# Patient Record
Sex: MALE | Race: BLACK OR AFRICAN AMERICAN | NOT HISPANIC OR LATINO | URBAN - METROPOLITAN AREA
[De-identification: names, ages, dates, MRNs, and addresses within clinical notes are randomized per-mention and may not be internally consistent; named-entity substitution may affect disease eponyms.]

---

## 2021-12-31 ENCOUNTER — LAB VISIT (OUTPATIENT)
Dept: PRIMARY CARE CLINIC | Facility: OTHER | Age: 26
End: 2021-12-31
Payer: MEDICAID

## 2021-12-31 DIAGNOSIS — R05.9 COUGH: ICD-10-CM

## 2021-12-31 PROCEDURE — U0003 INFECTIOUS AGENT DETECTION BY NUCLEIC ACID (DNA OR RNA); SEVERE ACUTE RESPIRATORY SYNDROME CORONAVIRUS 2 (SARS-COV-2) (CORONAVIRUS DISEASE [COVID-19]), AMPLIFIED PROBE TECHNIQUE, MAKING USE OF HIGH THROUGHPUT TECHNOLOGIES AS DESCRIBED BY CMS-2020-01-R: HCPCS | Mod: ST72

## 2022-01-04 LAB
SARS-COV-2 RNA RESP QL NAA+PROBE: DETECTED
SARS-COV-2- CYCLE NUMBER: 22

## 2022-11-08 PROCEDURE — 96375 TX/PRO/DX INJ NEW DRUG ADDON: CPT

## 2022-11-08 PROCEDURE — 99284 EMERGENCY DEPT VISIT MOD MDM: CPT | Mod: 25

## 2022-11-08 PROCEDURE — 96374 THER/PROPH/DIAG INJ IV PUSH: CPT

## 2022-11-09 ENCOUNTER — HOSPITAL ENCOUNTER (EMERGENCY)
Facility: HOSPITAL | Age: 27
Discharge: HOME | End: 2022-11-09
Attending: STUDENT IN AN ORGANIZED HEALTH CARE EDUCATION/TRAINING PROGRAM
Payer: MEDICAID

## 2022-11-09 VITALS
HEART RATE: 66 BPM | BODY MASS INDEX: 19.62 KG/M2 | SYSTOLIC BLOOD PRESSURE: 130 MMHG | OXYGEN SATURATION: 99 % | WEIGHT: 125 LBS | DIASTOLIC BLOOD PRESSURE: 58 MMHG | HEIGHT: 67 IN | RESPIRATION RATE: 22 BRPM | TEMPERATURE: 98 F

## 2022-11-09 DIAGNOSIS — R00.1 BRADYCARDIA: ICD-10-CM

## 2022-11-09 DIAGNOSIS — R55 SYNCOPE: ICD-10-CM

## 2022-11-09 DIAGNOSIS — F11.93 OPIATE WITHDRAWAL: Primary | ICD-10-CM

## 2022-11-09 LAB
ALBUMIN SERPL BCP-MCNC: 4.9 G/DL (ref 3.5–5.2)
ALP SERPL-CCNC: 55 U/L (ref 55–135)
ALT SERPL W/O P-5'-P-CCNC: 20 U/L (ref 10–44)
ANION GAP SERPL CALC-SCNC: 18 MMOL/L (ref 8–16)
AST SERPL-CCNC: 22 U/L (ref 10–40)
BASOPHILS # BLD AUTO: 0.02 K/UL (ref 0–0.2)
BASOPHILS NFR BLD: 0.1 % (ref 0–1.9)
BILIRUB SERPL-MCNC: 0.5 MG/DL (ref 0.1–1)
BUN SERPL-MCNC: 12 MG/DL (ref 6–20)
CALCIUM SERPL-MCNC: 10.2 MG/DL (ref 8.7–10.5)
CHLORIDE SERPL-SCNC: 102 MMOL/L (ref 95–110)
CK SERPL-CCNC: 559 U/L (ref 20–200)
CO2 SERPL-SCNC: 20 MMOL/L (ref 23–29)
CREAT SERPL-MCNC: 1.2 MG/DL (ref 0.5–1.4)
DIFFERENTIAL METHOD: ABNORMAL
EOSINOPHIL # BLD AUTO: 0 K/UL (ref 0–0.5)
EOSINOPHIL NFR BLD: 0 % (ref 0–8)
ERYTHROCYTE [DISTWIDTH] IN BLOOD BY AUTOMATED COUNT: 15 % (ref 11.5–14.5)
EST. GFR  (NO RACE VARIABLE): >60 ML/MIN/1.73 M^2
GLUCOSE SERPL-MCNC: 125 MG/DL (ref 70–110)
HCT VFR BLD AUTO: 43.6 % (ref 40–54)
HGB BLD-MCNC: 14 G/DL (ref 14–18)
IMM GRANULOCYTES # BLD AUTO: 0.05 K/UL (ref 0–0.04)
IMM GRANULOCYTES NFR BLD AUTO: 0.3 % (ref 0–0.5)
LYMPHOCYTES # BLD AUTO: 1.5 K/UL (ref 1–4.8)
LYMPHOCYTES NFR BLD: 10.4 % (ref 18–48)
MCH RBC QN AUTO: 22.8 PG (ref 27–31)
MCHC RBC AUTO-ENTMCNC: 32.1 G/DL (ref 32–36)
MCV RBC AUTO: 71 FL (ref 82–98)
MONOCYTES # BLD AUTO: 0.5 K/UL (ref 0.3–1)
MONOCYTES NFR BLD: 3.7 % (ref 4–15)
NEUTROPHILS # BLD AUTO: 12.3 K/UL (ref 1.8–7.7)
NEUTROPHILS NFR BLD: 85.5 % (ref 38–73)
NRBC BLD-RTO: 0 /100 WBC
PLATELET # BLD AUTO: 288 K/UL (ref 150–450)
PMV BLD AUTO: 10 FL (ref 9.2–12.9)
POTASSIUM SERPL-SCNC: 3.9 MMOL/L (ref 3.5–5.1)
PROT SERPL-MCNC: 8.3 G/DL (ref 6–8.4)
RBC # BLD AUTO: 6.13 M/UL (ref 4.6–6.2)
SODIUM SERPL-SCNC: 140 MMOL/L (ref 136–145)
WBC # BLD AUTO: 14.43 K/UL (ref 3.9–12.7)

## 2022-11-09 PROCEDURE — 25000003 PHARM REV CODE 250: Performed by: STUDENT IN AN ORGANIZED HEALTH CARE EDUCATION/TRAINING PROGRAM

## 2022-11-09 PROCEDURE — 93010 ELECTROCARDIOGRAM REPORT: CPT | Mod: ,,, | Performed by: INTERNAL MEDICINE

## 2022-11-09 PROCEDURE — 93005 ELECTROCARDIOGRAM TRACING: CPT

## 2022-11-09 PROCEDURE — 93010 EKG 12-LEAD: ICD-10-PCS | Mod: ,,, | Performed by: INTERNAL MEDICINE

## 2022-11-09 PROCEDURE — 63600175 PHARM REV CODE 636 W HCPCS: Performed by: STUDENT IN AN ORGANIZED HEALTH CARE EDUCATION/TRAINING PROGRAM

## 2022-11-09 PROCEDURE — 82550 ASSAY OF CK (CPK): CPT | Performed by: STUDENT IN AN ORGANIZED HEALTH CARE EDUCATION/TRAINING PROGRAM

## 2022-11-09 PROCEDURE — 80053 COMPREHEN METABOLIC PANEL: CPT | Performed by: STUDENT IN AN ORGANIZED HEALTH CARE EDUCATION/TRAINING PROGRAM

## 2022-11-09 PROCEDURE — 85025 COMPLETE CBC W/AUTO DIFF WBC: CPT | Performed by: STUDENT IN AN ORGANIZED HEALTH CARE EDUCATION/TRAINING PROGRAM

## 2022-11-09 RX ORDER — KETOROLAC TROMETHAMINE 30 MG/ML
15 INJECTION, SOLUTION INTRAMUSCULAR; INTRAVENOUS
Status: COMPLETED | OUTPATIENT
Start: 2022-11-09 | End: 2022-11-09

## 2022-11-09 RX ORDER — ONDANSETRON 2 MG/ML
4 INJECTION INTRAMUSCULAR; INTRAVENOUS
Status: COMPLETED | OUTPATIENT
Start: 2022-11-09 | End: 2022-11-09

## 2022-11-09 RX ORDER — DICYCLOMINE HYDROCHLORIDE 10 MG/1
10 CAPSULE ORAL
Status: COMPLETED | OUTPATIENT
Start: 2022-11-09 | End: 2022-11-09

## 2022-11-09 RX ORDER — DICYCLOMINE HYDROCHLORIDE 10 MG/1
10 CAPSULE ORAL EVERY 6 HOURS PRN
Qty: 28 CAPSULE | Refills: 0 | Status: SHIPPED | OUTPATIENT
Start: 2022-11-09 | End: 2022-11-16

## 2022-11-09 RX ORDER — ONDANSETRON 4 MG/1
4 TABLET, ORALLY DISINTEGRATING ORAL EVERY 6 HOURS PRN
Qty: 15 TABLET | Refills: 0 | Status: SHIPPED | OUTPATIENT
Start: 2022-11-09

## 2022-11-09 RX ORDER — HYDROXYZINE PAMOATE 25 MG/1
25 CAPSULE ORAL EVERY 6 HOURS PRN
Qty: 28 CAPSULE | Refills: 0 | Status: SHIPPED | OUTPATIENT
Start: 2022-11-09 | End: 2022-11-16

## 2022-11-09 RX ADMIN — KETOROLAC TROMETHAMINE 15 MG: 30 INJECTION, SOLUTION INTRAMUSCULAR at 12:11

## 2022-11-09 RX ADMIN — DICYCLOMINE HYDROCHLORIDE 10 MG: 10 CAPSULE ORAL at 12:11

## 2022-11-09 RX ADMIN — ONDANSETRON HYDROCHLORIDE 4 MG: 2 SOLUTION INTRAMUSCULAR; INTRAVENOUS at 12:11

## 2022-11-09 NOTE — Clinical Note
Shweta Bailon accompanied their family member to the emergency department on 11/8/2022. They may return to work on 11/10/2022.      If you have any questions or concerns, please don't hesitate to call.      Sameera Black, DO

## 2022-11-09 NOTE — ED NOTES
Physical Assessment    LOC: The patient is awake, alert and aware of environment with an appropriate affect, the patient is oriented x 3 and speaking appropriately.     Psych: Patient is calm and cooperative with poor eye contact.    APPEARANCE: Patient is clean and non toxic appearing    NEUROLOGIC:  VERNON, Follows commands without difficulty. Speech is clear. No neuro deficits observed. (C/O posterior headache without deficit)    HEENT: Denies HEENT complaint or injury, moist mucus pink - tongue dry (vomiting and nausea)    RESPIRATORY: Airway is open and patent, respirations are spontaneous; patient has a normal effort and rate. Bilateral breath sounds are clear. Pink nailbeds.     CARDIAC: Patient has a Pro rate and rhythm, no peripheral edema noted, capillary refill < 2 seconds. PULSES are symmetrical in all extremities    GI/ : Soft and non tender to palpation, no distention noted. - (Nausea & Vomiting)    MUSCULOSKELETAL:  Normal range of motion noted. Moves all extremities well, no c/o pain, no deformity noted.    SKIN: The skin is warm, dry and intact. Patient has normal skin turgor. No rashes or lesions. No Breakdown noted.

## 2022-11-09 NOTE — DISCHARGE INSTRUCTIONS
Mental Health Clinics:    [facility] on Monday, Wednesday & Thursday 8:30 am-12:00 pm.   Appointments are first come, first served.  [address] [place] [address]  [phone].    [facility] [address] [place] [address] [phone]  [facility] [address] [facility] [phone]   [facility] [address] [phone]   [facility] [address] [address] [phone]   [facility] [address] [phone]   [facility] [address] [facility] [phone]   [facility] [address] [facility] [phone]   [facility] [address] [facility] [phone]   [facility] [phone]   Medicare and Medicaid Services [phone]     [place] [facility] Suicide Prevention Crisis Hotline: 980.920.3476    [facility] Outpatient Clinics:  1. [facility], [address] [facility] , [phone]  2. [facility], [address] [facility], [phone]  3. [facility]: [address], [place], [phone]  4. [facility], [address], [place] [phone]  5. [facility]: [address] [facility], [phone]  6. [facility], [address] [facility], [address], [phone]    [facility]:   16. Alcoholics Anonymous: AA at [facility], [address] [facility], Wednesdays 7pm, call [phone]  17. [facility]: [address] [facility] [phone], Heath Perea ext [phone] Tuesdays 10am  18. [facility]stance Abuse [facility], [address], [place] [phone]  19. [facility]: Neno Cevallos MD, [phone]  20. [facility], [address] [place] [address], [phone]  21. [facility], [address] [facility] [address], [address], [phone]    Inpatient Drug Rehab:  22. [facility]: [facility], Males and Females, [phone]  23. [facility] ([facility]),: [address] [facility], females only, [phone], ext [phone], contact Shaunna Fernandez  24. [facility] [address] [facility], [phone]  25. [facility]: [address], [place] [address], [phone]  26. [facility], [address] [place] [address], fee based: [phone]  27. [facility] rehabilitation program: [phone]

## 2022-11-09 NOTE — Clinical Note
Shweta Uvaldo accompanied their caregiver to the emergency department on 11/8/2022. They may return to work on 11/10/2022.      If you have any questions or concerns, please don't hesitate to call.      Sameera Black, DO

## 2022-11-09 NOTE — ED NOTES
Removed IV cath tip intact. VS updated  Educated on discharge instructions and Rx.  Educated on diet and advance as tolerated.  Pt / family verbalized understanding  Ambulatory at time of discharge

## 2022-11-09 NOTE — ED NOTES
Pt brought to rm 13 and assisted to ER stretcher which is low and locked.  Pt assisted into hospital gown and placed on cardiac monitor.  Family in rm.

## 2022-11-09 NOTE — ED PROVIDER NOTES
NAME:  Benson Navas  CSN:     [id number]  MRN:    [id number]  ADMIT DATE: 11/8/2022        eMERGENCY dEPARTMENT eNCOUnter    CHIEF COMPLAINT    Chief Complaint   Patient presents with    Withdrawal     Patient reports he wants detox from heroine and fentanyl (nasal).  Last used three days ago.  Family reports patient passing out 2- 3 times daily and seizing (foaming from the mouth and violently shaking).  Patient states he has body aches all over.       HPI    Benson Navas is a 27 y.o. male with a past medical history of  has a past medical history of Depression.     he presents to the ED due to symptoms of withdrawal from fentanyl and heroin.  States he was only snorting and smoking it.  Last use was 2 days ago.  Notes diffuse body aches, abdominal cramping.  No diarrhea or vomiting.  Notes shaking episodes with foaming at the mouth with last episode occurring yesterday.  He denies any uses of benzodiazepines.  He states he has gone through withdrawal once previously while in half-way and was very similar to this.  No fevers.  No injuries.  He is here with family and they  are requesting rehabilitation resources.    HPI       PAST MEDICAL HISTORY  Past Medical History:   Diagnosis Date    Depression        SURGICAL HISTORY    No past surgical history on file.    FAMILY HISTORY    No family history on file.    SOCIAL HISTORY    Social History     Socioeconomic History    Marital status: Single   Tobacco Use    Smoking status: Light Smoker     Packs/day: 0.25     Years: 2.00     Pack years: 0.50     Types: Cigarettes   Substance and Sexual Activity    Alcohol use: Yes     Alcohol/week: 2.0 standard drinks     Types: 2 Cans of beer per week    Drug use: Yes     Frequency: 1.0 times per week     Types: Cocaine    Sexual activity: Yes     Partners: Female     Birth control/protection: None   Other Topics Concern    Patient feels they ought to cut down on drinking/drug use No    Patient annoyed by others criticizing their  "drinking/drug use No    Patient has felt bad or guilty about drinking/drug use No    Patient has had a drink/used drugs as an eye opener in the AM No       MEDICATIONS  Current Outpatient Medications   Medication Instructions    citalopram (CELEXA) 20 mg, Oral, Daily, Depression    dicyclomine (BENTYL) 10 mg, Oral, Every 6 hours PRN    hydrOXYzine pamoate (VISTARIL) 25 mg, Oral, Every 6 hours PRN    ondansetron (ZOFRAN-ODT) 4 mg, Oral, Every 6 hours PRN       ALLERGIES    Review of patient's allergies indicates:   Allergen Reactions    Mushroom flavor          REVIEW OF SYSTEMS   Review of Systems   Constitutional:  Positive for activity change, appetite change, chills and diaphoresis. Negative for fever.   HENT:  Negative for sore throat.    Respiratory:  Negative for shortness of breath.    Cardiovascular:  Negative for chest pain.   Gastrointestinal:  Positive for abdominal pain and nausea. Negative for diarrhea and vomiting.   Genitourinary:  Negative for dysuria.   Musculoskeletal:  Negative for back pain.   Skin:  Negative for rash.   Neurological:  Positive for tremors. Negative for weakness.   Hematological:  Does not bruise/bleed easily.        PHYSICAL EXAM    Reviewed Triage Note    VITAL SIGNS:   ED Triage Vitals [11/08/22 2355]   Enc Vitals Group      /75      Pulse (!) 51      Resp 16      Temp 98.2 °F (36.8 °C)      Temp src Oral      SpO2 100 %      Weight 125 lb      Height       Head Circumference       Peak Flow       Pain Score       Pain Loc       Pain Edu?       Excl. in GC?        Patient Vitals for the past 24 hrs:   BP Temp Temp src Pulse Resp SpO2 Height Weight   11/09/22 0132 (!) 130/58 -- -- -- -- 99 % -- --   11/09/22 0129 -- -- -- 66 -- 100 % -- --   11/09/22 0118 -- -- -- -- -- -- 5' 7" (1.702 m) --   11/09/22 0032 125/68 -- -- (!) 58 (!) 22 100 % -- --   11/09/22 0016 133/75 -- -- (!) 57 (!) 24 100 % -- --   11/09/22 0010 -- -- -- (!) 58 -- -- -- --   11/08/22 2355 129/75 98.2 " °F (36.8 °C) Oral (!) 51 16 100 % -- 56.7 kg (125 lb)       Physical Exam    Nursing note and vitals reviewed.  Constitutional: He appears well-developed and well-nourished.   Intermittently writhing in the bed, does appear uncomfortable.   HENT:   Head: Normocephalic and atraumatic.   Eyes: EOM are normal. Pupils are equal, round, and reactive to light.   Neck: Neck supple.   Normal range of motion.  Cardiovascular:  Regular rhythm.   Bradycardia present.         Pulmonary/Chest: Breath sounds normal. No respiratory distress.   Abdominal: Abdomen is soft. There is no abdominal tenderness.   Musculoskeletal:         General: Normal range of motion.      Cervical back: Normal range of motion and neck supple.     Neurological: He is alert and oriented to person, place, and time.   Skin: Skin is warm and dry.   Psychiatric: He has a normal mood and affect.          EKG     Interpreted by EM physician if performed:   Sinus bradycardia at a rate of 54.  No ST elevations or depressions.  No T-wave inversions.  No prior for comparison.  QTC of 398.       ECG Results              EKG 12-lead (Syncope) Age >50 (In process)  Result time 11/09/22 11:09:27      In process by Interface, Lab In [facility] (11/09/22 11:09:27)                   Narrative:    Test Reason : R55,    Vent. Rate : 054 BPM     Atrial Rate : 054 BPM     P-R Int : 152 ms          QRS Dur : 086 ms      QT Int : 420 ms       P-R-T Axes : 070 051 045 degrees     QTc Int : 398 ms    Sinus bradycardia with sinus arrhythmia  Otherwise normal ECG  No previous ECGs available    Referred By: [email or web address]   SELF           Confirmed By:                                       LABS  Pertinent labs reviewed. (See chart for details)   Labs Reviewed   CBC W/ AUTO DIFFERENTIAL - Abnormal; Notable for the following components:       Result Value    WBC 14.43 (*)     MCV 71 (*)     MCH 22.8 (*)     RDW 15.0 (*)     Gran # (ANC) 12.3 (*)     Immature Grans (Abs) 0.05 (*)     Gran %  85.5 (*)     Lymph % 10.4 (*)     Mono % 3.7 (*)     All other components within normal limits   COMPREHENSIVE METABOLIC PANEL - Abnormal; Notable for the following components:    CO2 20 (*)     Glucose 125 (*)     Anion Gap 18 (*)     All other components within normal limits   CK - Abnormal; Notable for the following components:     (*)     All other components within normal limits         RADIOLOGY          Imaging Results    None           PROCEDURES    Procedures      ED COURSE & MEDICAL DECISION MAKING    Pertinent Labs & Imaging studies reviewed. (See chart for details and specific orders.)          Benson Navas is a 27 y.o. male presenting for acute opiate withdrawal.  States last use was 2 days ago.  He is bradycardic and normotensive here.  Does appear uncomfortable, however, nontoxic.  Patient and family requesting detox and rehabilitation resources rule. COWS score of 6.           Medications   ondansetron injection 4 mg (4 mg Intravenous Given 11/9/22 0040)   dicyclomine capsule 10 mg (10 mg Oral Given 11/9/22 0040)   ketorolac injection 15 mg (15 mg Intravenous Given 11/9/22 0040)       ED Course as of 11/09/22 1848   Wed Nov 09, 2022   0128 CPK(!): 559  No e/o rhabdo, normal renal function.  [HL]   0128 WBC(!): 14.43  Likely reactive [HL]      ED Course User Index  [HL] Sameera Black,      Treated symptomatically with rx provided for the same. Outpatient resources provided and reasons to return discussed. Pt with family at bedside. Comfortable with plan of d/c home at this time.       FINAL IMPRESSION    Final diagnoses:  [F11.93] Opiate withdrawal (Primary)  [R55] Syncope  [R00.1] Bradycardia     DISPOSITION  Patient discharged in stable condition        ED Prescriptions       Medication Sig Dispense Start Date End Date Auth. Provider    dicyclomine (BENTYL) 10 MG capsule Take 1 capsule (10 mg total) by mouth every 6 (six) hours as needed (abdominal cramping). 28 capsule 11/9/2022 11/16/2022  Sameera Black DO    ondansetron (ZOFRAN-ODT) 4 MG TbDL Take 1 tablet (4 mg total) by mouth every 6 (six) hours as needed. 15 tablet 11/9/2022 -- Sameera Black DO    hydrOXYzine pamoate (VISTARIL) 25 MG Cap Take 1 capsule (25 mg total) by mouth every 6 (six) hours as needed (anxiety). 28 capsule 11/9/2022 11/16/2022 Sameera Black DO          Follow-up Information       Follow up With Specialties Details Why Contact Info Additional Information    [facility] Schedule an appointment as soon as possible for a visit   [address], [address]  [facility] [address] [phone] Please park in [address] C or D and use [address]. Take Medical Office Bldg. elevators.              DISCLAIMER: This note was prepared with [employer] voice recognition transcription software. Garbled syntax, mangled pronouns, and other bizarre constructions may be attributed to that software system.      DO Sameera Palmer DO  11/09/22 2552